# Patient Record
Sex: FEMALE | Race: OTHER | NOT HISPANIC OR LATINO | URBAN - METROPOLITAN AREA
[De-identification: names, ages, dates, MRNs, and addresses within clinical notes are randomized per-mention and may not be internally consistent; named-entity substitution may affect disease eponyms.]

---

## 2023-07-02 ENCOUNTER — EMERGENCY (EMERGENCY)
Facility: HOSPITAL | Age: 28
LOS: 1 days | Discharge: HOME | End: 2023-07-02
Attending: EMERGENCY MEDICINE | Admitting: EMERGENCY MEDICINE
Payer: COMMERCIAL

## 2023-07-02 VITALS
DIASTOLIC BLOOD PRESSURE: 76 MMHG | SYSTOLIC BLOOD PRESSURE: 112 MMHG | TEMPERATURE: 97 F | OXYGEN SATURATION: 100 % | HEART RATE: 70 BPM | RESPIRATION RATE: 16 BRPM

## 2023-07-02 VITALS
WEIGHT: 187.39 LBS | HEIGHT: 64 IN | RESPIRATION RATE: 17 BRPM | DIASTOLIC BLOOD PRESSURE: 76 MMHG | SYSTOLIC BLOOD PRESSURE: 107 MMHG | OXYGEN SATURATION: 95 % | TEMPERATURE: 98 F | HEART RATE: 84 BPM

## 2023-07-02 DIAGNOSIS — N76.89 OTHER SPECIFIED INFLAMMATION OF VAGINA AND VULVA: ICD-10-CM

## 2023-07-02 DIAGNOSIS — N76.4 ABSCESS OF VULVA: ICD-10-CM

## 2023-07-02 DIAGNOSIS — Z88.1 ALLERGY STATUS TO OTHER ANTIBIOTIC AGENTS STATUS: ICD-10-CM

## 2023-07-02 PROCEDURE — 99284 EMERGENCY DEPT VISIT MOD MDM: CPT

## 2023-07-02 PROCEDURE — 56405 I&D VULVA/PERINEAL ABSCESS: CPT

## 2023-07-02 PROCEDURE — 99284 EMERGENCY DEPT VISIT MOD MDM: CPT | Mod: 25

## 2023-07-02 RX ORDER — ACETAMINOPHEN 500 MG
650 TABLET ORAL ONCE
Refills: 0 | Status: COMPLETED | OUTPATIENT
Start: 2023-07-02 | End: 2023-07-02

## 2023-07-02 RX ORDER — LIDOCAINE HCL 20 MG/ML
10 VIAL (ML) INJECTION ONCE
Refills: 0 | Status: DISCONTINUED | OUTPATIENT
Start: 2023-07-02 | End: 2023-07-06

## 2023-07-02 RX ORDER — LIDOCAINE HYDROCHLORIDE AND EPINEPHRINE 10; 10 MG/ML; UG/ML
10 INJECTION, SOLUTION INFILTRATION; PERINEURAL ONCE
Refills: 0 | Status: DISCONTINUED | OUTPATIENT
Start: 2023-07-02 | End: 2023-07-06

## 2023-07-02 RX ORDER — IBUPROFEN 200 MG
600 TABLET ORAL ONCE
Refills: 0 | Status: COMPLETED | OUTPATIENT
Start: 2023-07-02 | End: 2023-07-02

## 2023-07-02 RX ADMIN — Medication 650 MILLIGRAM(S): at 17:54

## 2023-07-02 RX ADMIN — Medication 600 MILLIGRAM(S): at 17:54

## 2023-07-02 NOTE — ED PROVIDER NOTE - OBJECTIVE STATEMENT
28 yo w recurrent cysts with R labial swelling worsening today and drainage. no f/c, vaginal bleeding, taking ibuprofen yesterday.

## 2023-07-02 NOTE — ED ADULT TRIAGE NOTE - CHIEF COMPLAINT QUOTE
Pt c/o R labial cyst x 3 days, seen at  yesterday and prescribed abx. Pt reports history of cysts, "I never had to have one drained before, but this one isn't going away". Denies fevers, drainage. PMH asthma.

## 2023-07-02 NOTE — ED PROVIDER NOTE - CARE PROVIDER_API CALL
Asia Fernandez  Obstetrics and Gynecology  [address]  [place] [address]  Phone: [phone]  Fax: [phone]  Follow Up Time:

## 2023-07-02 NOTE — ED PROVIDER NOTE - CLINICAL SUMMARY MEDICAL DECISION MAKING FREE TEXT BOX
28 yo w recurrent cysts with R labial swelling worsening today and drainage. no f/c, vaginal bleeding, taking ibuprofen yesterday, likely abscess s/p I&D by gyn, to [facility] as outpt.

## 2023-07-02 NOTE — ED ADULT NURSE NOTE - NSFALLUNIVINTERV_ED_ALL_ED
Bed/Stretcher in lowest position, wheels locked, appropriate side rails in place/Call bell, personal items and telephone in reach/Instruct patient to call for assistance before getting out of bed/chair/stretcher/Non-slip footwear applied when patient is off stretcher/[place] to call system/Physically safe environment - no spills, clutter or unnecessary equipment/Purposeful proactive rounding/Room/bathroom lighting operational, light cord in reach

## 2023-07-02 NOTE — ED PROVIDER NOTE - PHYSICAL EXAMINATION
CONSTITUTIONAL: Awake, alert and in no apparent distress.  HEENT: Head is atraumatic. Eyes clear bilaterally, normal EOMI. Airway patent.  : R labial swelling w purulent drainage  NEUROLOGICAL: Alert, no focal deficits, no motor or sensory deficits.  SKIN: Skin normal color for race, warm, dry and intact. No evidence of rash.  PSYCHIATRIC: Normal mood and affect. no apparent risk to self or others.

## 2023-07-02 NOTE — CONSULT NOTE ADULT - ASSESSMENT
*note incomplete pending exam* Pt is a 28 yo P0 presenting to ED for vulvar abscess  - Clinically and hemodynamically stable  - Patient consented for incision and drainage of abscess. Procedure performed with expression of minimal pus.   - Patient should continue her full course of bactrim as prescribed by urgent care  - Plan to follow up at Dr. Asia Fernandez's office on [address] to ensure abscess is healing and for routine GYN care    Seen and discussed with attending Dr. Fernandez.    PGY2 Pt is a 26 yo P0 presenting to ED for vulvar abscess  - Clinically and hemodynamically stable  - Patient consented for incision and drainage of abscess. Procedure performed with expression of minimal pus. Pus not cultured, as patient is already pending culture from Urgent Care and this is first episode requiring drainage. Plan to culture in office if repeat I&D is required.   - Patient should continue her full course of bactrim as prescribed by urgent care  - Plan to follow up at Dr. Asia Fernandez's office on [address] to ensure abscess is healing and for routine GYN care    Seen and discussed with attending Dr. Fernandez.    PGY2 Pt is a 28 yo P0 presenting to ED for vulvar abscess  - Clinically and hemodynamically stable  - Patient consented for incision and drainage of abscess. Procedure performed with expression of minimal pus. Pus not cultured, as patient is already pending culture from Urgent Care and this is first episode requiring drainage. Plan to culture in office if repeat I&D is required.   - Patient should continue her full course of bactrim as prescribed by urgent care  - Plan to follow up at Dr. Asia Fernandez's office on [address] in 2 wks to ensure abscess is healing and for routine GYN care    Seen and discussed with attending Dr. Fernandez.    PGY2

## 2023-07-02 NOTE — ED ADULT NURSE NOTE - NSFALLASSESSNEED_ED_ALL_ED
Findings discussed with patient in detail. Pt will closely monitor symptoms for any change and understands the importance of prompt outpatient follow up and will return if worse.    no

## 2023-07-02 NOTE — CONSULT NOTE ADULT - SUBJECTIVE AND OBJECTIVE BOX
Pt is a 26 yo P0 presenting to ED for vulvar abscess. She states she has a history of Bartholin's cysts and 3 days ago she noticed that same area becoming more tender and enlarging. She had fluconazole at home and took two doses of that stating she had used it in the past for this problem. She had a bartholin's cysts one year ago that resolved with fluconazole. Currently she experiences 7-8/10 pain with movement or touching of the area, but no pain at rest. She took motrin yesterday with no relief. She states it is draining blood pus. She went to urgent care one day ago where she received bactrim which she has been taking. States that she may have shaved in that area a few days ago but does not remember exactly. Pt denies fever, chills, chest pain, SOB, abdominal pain, nausea, vomiting, vaginal bleeding.       OB/GYN Hx: P0. LMP 1.5-2 months ago. Irregular periods 2/2 PCOS diagnosed with US in [place]. No using contraception. Last sexually active 3 months ago with men only.   Last PAP smear:     PMHx: PCOS, asthma  SHx: denies  Meds: denies  Allergies: azithromycin - diarrhea    Social hx:     PHYSICAL EXAM:   Vital Signs Last 24 Hrs  T(C): 36.3 (02 Jul 2023 19:44), Max: 36.9 (02 Jul 2023 16:54)  T(F): 97.4 (02 Jul 2023 19:44), Max: 98.4 (02 Jul 2023 16:54)  HR: 74 (02 Jul 2023 19:44) (74 - 84)  BP: 106/71 (02 Jul 2023 19:44) (106/71 - 107/76)  BP(mean): --  RR: 18 (02 Jul 2023 19:44) (17 - 18)  SpO2: 98% (02 Jul 2023 19:44) (95% - 98%)    Parameters below as of 02 Jul 2023 19:44  Patient On (Oxygen Delivery Method): room air        **************************  Constitutional: Alert & Oriented x3, No acute distress  Respiratory: no increased WOB  Gastrointestinal: soft, non tender, no rebound or guarding   Pelvic exam: enlarged cystic structure on right labia majora, draining yellow pus   Extremities: no calf tenderness or swelling    LABS:                  RADIOLOGY & ADDITIONAL STUDIES: Pt is a 28 yo P0 presenting to ED for vulvar abscess. She states she has a history of Bartholin's cysts and 3 days ago she noticed that same area becoming more tender and enlarging. She had fluconazole at home and took two doses of that stating she had used it in the past for this problem. She had a bartholin's cysts one year ago that resolved with fluconazole. Currently she experiences 7-8/10 pain with movement or touching of the area, but no pain at rest. She took motrin yesterday with no relief. She states it is draining blood pus. She went to urgent care one day ago where she received bactrim which she has been taking. States that she may have shaved in that area a few days ago but does not remember exactly. Pt denies fever, chills, chest pain, SOB, abdominal pain, nausea, vomiting, vaginal bleeding.       OB/GYN Hx: P0. LMP 1.5-2 months ago. Irregular periods 2/2 PCOS diagnosed with US in [place]. Not using contraception. Last sexually active 3 months ago with men only.   Last PAP smear: never performed    PMHx: PCOS, asthma  SHx: denies  Meds: denies  Allergies: azithromycin - diarrhea      PHYSICAL EXAM:   Vital Signs Last 24 Hrs  T(C): 36.3 (02 Jul 2023 19:44), Max: 36.9 (02 Jul 2023 16:54)  T(F): 97.4 (02 Jul 2023 19:44), Max: 98.4 (02 Jul 2023 16:54)  HR: 74 (02 Jul 2023 19:44) (74 - 84)  BP: 106/71 (02 Jul 2023 19:44) (106/71 - 107/76)  BP(mean): --  RR: 18 (02 Jul 2023 19:44) (17 - 18)  SpO2: 98% (02 Jul 2023 19:44) (95% - 98%)    Parameters below as of 02 Jul 2023 19:44  Patient On (Oxygen Delivery Method): room air        **************************  Constitutional: Alert & Oriented x3, No acute distress  Respiratory: no increased WOB  Gastrointestinal: soft, non tender, no rebound or guarding   Pelvic exam: enlarged 3cm  cystic structure on right labia majora, draining yellow pus, not extending into vagina   Extremities: no calf tenderness or swelling   Pt is a 28 yo P0 presenting to ED for vulvar pain. She states she has a history of Bartholin's cysts and 3 days ago she noticed that same area becoming more tender and enlarging. She had fluconazole at home and took two doses of that, stating she had used it in the past for this problem. Currently she experiences 7-8/10 pain with movement or touching of the area, but no pain at rest. She took motrin yesterday with no relief. She states it is draining bloody pus, but she had not noticed this until arriving in the ED. She went to urgent care one day ago where she received bactrim which she has been taking. They also cultured the abscess at that time. States that she may have shaved in that area a few days ago but does not remember exactly. Pt denies fever, chills, chest pain, SOB, abdominal pain, nausea, vomiting, vaginal bleeding. She was last sexually active 3 months ago. She had a bartholin's cysts one year ago that resolved with fluconazole. It did not require drainage and patient states it was not as bad as this occurrence. Last year was the only time she remembers having had an issue, but has noticed a bump in that area prior to then. She does not have a GYN here and recently moved from [place] in the past few months.       OB/GYN Hx: P0. LMP 1.5-2 months ago. Irregular periods 2/2 PCOS diagnosed with US in [place]. Not using contraception. Last sexually active 3 months ago with men only.   Last PAP smear: never performed    PMHx: PCOS, asthma  SHx: denies  Meds: denies  Allergies: azithromycin - diarrhea      PHYSICAL EXAM:   Vital Signs Last 24 Hrs  T(C): 36.3 (02 Jul 2023 19:44), Max: 36.9 (02 Jul 2023 16:54)  T(F): 97.4 (02 Jul 2023 19:44), Max: 98.4 (02 Jul 2023 16:54)  HR: 74 (02 Jul 2023 19:44) (74 - 84)  BP: 106/71 (02 Jul 2023 19:44) (106/71 - 107/76)  BP(mean): --  RR: 18 (02 Jul 2023 19:44) (17 - 18)  SpO2: 98% (02 Jul 2023 19:44) (95% - 98%)    Parameters below as of 02 Jul 2023 19:44  Patient On (Oxygen Delivery Method): room air        **************************  Constitutional: Alert & Oriented x3, No acute distress  Respiratory: no increased WOB  Gastrointestinal: soft, non tender, no rebound or guarding   Pelvic exam: enlarged 3cm cystic structure on right labia majora, non-erythematous, draining yellow pus, not extending into vagina on digital exam. Left labia majora, clitoral lala/clitoris, b/l labia minora normal appearing. No vaginal discharge noted.   Extremities: no calf tenderness or swelling      Procedure I&D of Vulvar Abscess:  - Patient consented for incision and drainage - risks/benefits/alternatives explained to patient. Patient asked all questions and showed understanding.   - Area prepped with alcohol and then lidocaine gel applied. Lidocaine 1% injected to numb area  - Manual expression performed with drainage of minimal yellow pus  - 11 blade scalpel used to create 2mm incision at 7 o'clock position in middle of R labia majora, follow by manual expression of minimal yellow pus  - Digital vaginal exam performed and abscess found not to extend into vagina  - Abscess no longer feeling fluctuant. Scar tissue palpated. Minimal bleeding resolving with pressure. Patient tolerated procedure well.     Pt is a 26 yo P0 presenting to ED for vulvar pain. She states she has a history of Bartholin's cysts and 3 days ago she noticed that same area becoming more tender and enlarging. She had fluconazole at home and took two doses of that, stating she had used it in the past for this problem. Currently she experiences 7-8/10 pain with movement or touching of the area, but no pain at rest. She took motrin yesterday with no relief. She states it is draining bloody pus, but she had not noticed this until arriving in the ED. She went to urgent care one day ago where she received bactrim which she has been taking. They also cultured the abscess at that time. States that she may have shaved in that area a few days ago but does not remember exactly. Pt denies fever, chills, chest pain, SOB, abdominal pain, nausea, vomiting, vaginal bleeding. No burning, itching, vaginal discharge or lesions. She was last sexually active 3 months ago. She had a bartholin's cysts one year ago that resolved with fluconazole. It did not require drainage and patient states it was not as bad as this occurrence. Last year was the only time she remembers having had an issue, but has noticed a bump in that area prior to then. She does not have a GYN here and recently moved from [place] in the past few months.       OB/GYN Hx: P0. LMP 1.5-2 months ago. Irregular periods 2/2 PCOS diagnosed with US in [place]. Not using contraception. Last sexually active 3 months ago with men only.   Last PAP smear: never performed    PMHx: PCOS, asthma  SHx: denies  Meds: denies  Allergies: azithromycin - diarrhea      PHYSICAL EXAM:   Vital Signs Last 24 Hrs  T(C): 36.3 (02 Jul 2023 19:44), Max: 36.9 (02 Jul 2023 16:54)  T(F): 97.4 (02 Jul 2023 19:44), Max: 98.4 (02 Jul 2023 16:54)  HR: 74 (02 Jul 2023 19:44) (74 - 84)  BP: 106/71 (02 Jul 2023 19:44) (106/71 - 107/76)  BP(mean): --  RR: 18 (02 Jul 2023 19:44) (17 - 18)  SpO2: 98% (02 Jul 2023 19:44) (95% - 98%)    Parameters below as of 02 Jul 2023 19:44  Patient On (Oxygen Delivery Method): room air        **************************  Constitutional: Alert & Oriented x3, No acute distress  Respiratory: no increased WOB  Gastrointestinal: soft, non tender, no rebound or guarding   Pelvic exam: enlarged 3cm cystic structure on right labia majora, non-erythematous, draining yellow pus, not extending into vagina on digital exam. Left labia majora, clitoral lala/clitoris, b/l labia minora normal appearing. No vaginal discharge noted.   Extremities: no calf tenderness or swelling      Procedure I&D of Vulvar Abscess:  - Patient consented for incision and drainage - risks/benefits/alternatives explained to patient. Patient asked all questions and showed understanding.   - Area prepped with alcohol and then lidocaine gel applied. Lidocaine 1% injected to numb area  - Manual expression performed with drainage of minimal yellow pus  - 11 blade scalpel used to create 2mm incision at 7 o'clock position in middle of R labia majora, follow by manual expression of minimal yellow pus  - Digital vaginal exam performed and abscess found not to extend into vagina  - Abscess no longer feeling fluctuant. Scar tissue palpated. Minimal bleeding resolving with pressure. Patient tolerated procedure well.     Pt is a 28 yo P0 presenting to ED for vulvar pain. She states she has a history of Bartholin's cysts and 3 days ago she noticed that same area becoming more tender and enlarging. She had fluconazole at home and took two doses of that, stating she had used it in the past for this problem. Currently she experiences 7-8/10 pain with movement or touching of the area, but no pain at rest. She took motrin yesterday with no relief. She states it is draining bloody pus, but she had not noticed this until arriving in the ED. She went to urgent care one day ago where she received bactrim which she has been taking. They also cultured the abscess at that time. States that she may have shaved in that area a few days ago but does not remember exactly. Pt denies fever, chills, chest pain, SOB, abdominal pain, nausea, vomiting, vaginal bleeding. No burning, itching, vaginal discharge or lesions. She was last sexually active 3 months ago. She had a bartholin's cysts one year ago that resolved with fluconazole. It did not require drainage and patient states it was not as bad as this occurrence. Last year was the only time she remembers having had an issue, but has noticed a bump in that area prior to then. She does not have a GYN here and recently moved from [place] in the past few months.       OB/GYN Hx: P0. LMP 1.5-2 months ago. Irregular periods 2/2 PCOS diagnosed with US in [place]. Not using contraception. Last sexually active 3 months ago with men only.   Last PAP smear: never performed    PMHx: PCOS, asthma  SHx: denies  Meds: denies  Allergies: azithromycin - diarrhea  SH: as above      PHYSICAL EXAM:   Vital Signs Last 24 Hrs  T(C): 36.3 (02 Jul 2023 19:44), Max: 36.9 (02 Jul 2023 16:54)  T(F): 97.4 (02 Jul 2023 19:44), Max: 98.4 (02 Jul 2023 16:54)  HR: 74 (02 Jul 2023 19:44) (74 - 84)  BP: 106/71 (02 Jul 2023 19:44) (106/71 - 107/76)  BP(mean): --  RR: 18 (02 Jul 2023 19:44) (17 - 18)  SpO2: 98% (02 Jul 2023 19:44) (95% - 98%)    Parameters below as of 02 Jul 2023 19:44  Patient On (Oxygen Delivery Method): room air        **************************  Constitutional: Alert & Oriented x3, No acute distress  Respiratory: no increased WOB  Gastrointestinal: soft, non tender, no rebound or guarding   Pelvic exam: enlarged 3cm cystic structure on right labia majora, non-erythematous, draining yellow pus, not extending into vagina on digital exam. Left labia majora, clitoral lala/clitoris, b/l labia minora normal appearing. No vaginal discharge noted.   Extremities: no calf tenderness or swelling      Procedure I&D of Vulvar Abscess:  - Patient consented for incision and drainage - risks/benefits/alternatives explained to patient. Patient asked all questions and showed understanding.   - Area prepped with alcohol and then lidocaine gel applied. Lidocaine 1% injected to numb area  - Manual expression performed with drainage of minimal yellow pus  - 11 blade scalpel used to create 2mm incision at 7 o'clock position in middle of R labia majora, follow by manual expression of minimal yellow pus  - Digital vaginal exam performed and abscess found not to extend into vagina  - Abscess no longer feeling fluctuant. Scar tissue palpated. Minimal bleeding resolving with pressure. Patient tolerated procedure well.

## 2023-07-02 NOTE — ED ADULT NURSE NOTE - OBJECTIVE STATEMENT
27y F PMHx PCOS c/o R labial cyst. Pt states she has hx bartholin gland cysts, noticed one several days ago. Attempted relief w warm compresses, seen yesterday at outside UC prescribed bactrim but states her pain is getting worse. Pt also taking fluconazole x3 days. Endorses severe pain, difficulty walking d/t pain. Denies fevers/chills, drainage, radiating pain, blood, urinary sx. Pt AAOx4, speaking in full and clear sentences, no acute distress at this moment.

## 2023-07-02 NOTE — ED PROVIDER NOTE - PATIENT PORTAL LINK FT
You can access the FollowMyHealth Patient Portal offered by [facility] by registering at the following website: [email or web address]. By joining [employer]’s FollowMyHealth portal, you will also be able to view your health information using other applications (apps) compatible with our system.

## 2023-12-07 ENCOUNTER — EMERGENCY (EMERGENCY)
Facility: HOSPITAL | Age: 28
LOS: 1 days | Discharge: HOME | End: 2023-12-07
Attending: EMERGENCY MEDICINE | Admitting: EMERGENCY MEDICINE
Payer: COMMERCIAL

## 2023-12-07 VITALS
SYSTOLIC BLOOD PRESSURE: 105 MMHG | HEART RATE: 85 BPM | RESPIRATION RATE: 18 BRPM | TEMPERATURE: 98 F | OXYGEN SATURATION: 98 % | DIASTOLIC BLOOD PRESSURE: 54 MMHG

## 2023-12-07 VITALS
OXYGEN SATURATION: 98 % | SYSTOLIC BLOOD PRESSURE: 122 MMHG | HEART RATE: 104 BPM | RESPIRATION RATE: 18 BRPM | TEMPERATURE: 98 F | DIASTOLIC BLOOD PRESSURE: 89 MMHG

## 2023-12-07 DIAGNOSIS — R10.2 PELVIC AND PERINEAL PAIN: ICD-10-CM

## 2023-12-07 DIAGNOSIS — Z87.2 PERSONAL HISTORY OF DISEASES OF THE SKIN AND SUBCUTANEOUS TISSUE: ICD-10-CM

## 2023-12-07 DIAGNOSIS — Z88.1 ALLERGY STATUS TO OTHER ANTIBIOTIC AGENTS STATUS: ICD-10-CM

## 2023-12-07 DIAGNOSIS — N76.4 ABSCESS OF VULVA: ICD-10-CM

## 2023-12-07 PROCEDURE — 99284 EMERGENCY DEPT VISIT MOD MDM: CPT

## 2023-12-07 PROCEDURE — 99284 EMERGENCY DEPT VISIT MOD MDM: CPT | Mod: 25

## 2023-12-07 PROCEDURE — 56405 I&D VULVA/PERINEAL ABSCESS: CPT

## 2023-12-07 RX ORDER — CEPHALEXIN 500 MG
1 CAPSULE ORAL
Qty: 28 | Refills: 0
Start: 2023-12-07 | End: 2023-12-13

## 2023-12-07 NOTE — ED PROVIDER NOTE - PATIENT PORTAL LINK FT
You can access the FollowMyHealth Patient Portal offered by [facility] by registering at the following website: [email or web address]. By joining [employer]’s FollowMyHealth portal, you will also be able to view your health information using other applications (apps) compatible with our system. You can access the FollowMyHealth Patient Portal offered by [facility] by registering at the following website: [email or web address]. By joining [email or web address]’s FollowMyHealth portal, you will also be able to view your health information using other applications (apps) compatible with our system.

## 2023-12-07 NOTE — ED ADULT NURSE NOTE - NSFALLUNIVINTERV_ED_ALL_ED
Bed/Stretcher in lowest position, wheels locked, appropriate side rails in place/Call bell, personal items and telephone in reach/Instruct patient to call for assistance before getting out of bed/chair/stretcher/Non-slip footwear applied when patient is off stretcher/[place] to call system/Physically safe environment - no spills, clutter or unnecessary equipment/Purposeful proactive rounding/Room/bathroom lighting operational, light cord in reach Bed/Stretcher in lowest position, wheels locked, appropriate side rails in place/Call bell, personal items and telephone in reach/Instruct patient to call for assistance before getting out of bed/chair/stretcher/Non-slip footwear applied when patient is off stretcher/[place] to call system/Physically safe environment - no spills, clutter or unnecessary equipment/Purposeful proactive rounding/Room/bathroom lighting operational, light cord in reach

## 2023-12-07 NOTE — ED PROVIDER NOTE - CLINICAL SUMMARY MEDICAL DECISION MAKING FREE TEXT BOX
28-year-old female with abscess/cyst right labia.  She has had this twice before in the same area.  GYN consulted and they were able to open somewhat and drained some but there is a lot of scar tissue and they recommended to the patient that she get this surgically excised.  Patient does have a GYN.  GYN recommended a course of Keflex and follow-up in 1 week

## 2023-12-07 NOTE — CONSULT NOTE ADULT - SUBJECTIVE AND OBJECTIVE BOX
28y G0 presenting with right labial abscess. Patient has a history of right labial abscesses twice prior, first in 2018 and most recently in July 2023, which was drained here at [facility]. She reports the symptoms of pain and swelling began a week ago and she waited until it was large enough for it to be drained. She reports a subjective fever at home yesterday. The cyst has not drained anything. Denies abnormal vaginal discharge or VB. Reports that the cyst completely resolved the prior times, with no remaining palpable tissue. She also reports she began undergoing laser hair removal treatments as she was told the hair could be contributing to the recurrence of the cyst.    OB H/x: G0  GYN H/x: PCOS. Right labial cysts as above. Denies hx of fibroids, abnormal pap smears, STIs, ovarian cysts    MED H/x: asthma  SURG H/x: none  Medications: albuterol PRN  Allergies: NKDA; sensitivity to azithromycin - diarrhea    Vital Signs Last 24 Hrs  T(C): 36.8 (07 Dec 2023 15:22), Max: 36.8 (07 Dec 2023 14:53)  T(F): 98.2 (07 Dec 2023 15:22), Max: 98.2 (07 Dec 2023 14:53)  HR: 104 (07 Dec 2023 15:22) (104 - 104)  BP: 122/89 (07 Dec 2023 15:22) (122/89 - 122/89)  BP(mean): --  RR: 18 (07 Dec 2023 15:22) (18 - 18)  SpO2: 98% (07 Dec 2023 15:22) (98% - 98%)    Parameters below as of 07 Dec 2023 15:22  Patient On (Oxygen Delivery Method): room air        Physical Exam:  Gen: NAD, comfortable  GI: soft, nontender  GYN: 2 x2 cm right labial cyst at the midpoint of the right labia majora without surrounding edema or erythema; otherwise normal vulva and vaginal introitus  Ext: no edema, erythema, tenderness     PROCEDURE NOTE:  -the area was cleaned with chlorhexidine prep and 5 cc lidocaine 1% was injected to the area  -the cyst began draining spontaneously and purulent fluid was expressed  -a 5 mm incision was made at the site of drainage and minimal drainage was expressed; further drainage was attempted using a hemostat to attempt to break up tissue without success  -hemostasis achieved with pressure to the area   28y G0 presenting with right labial abscess. Patient has a history of right labial abscesses twice prior, first in 2018 and most recently in July 2023, which was drained here at [facility]. She reports the symptoms of pain and swelling began a week ago and she waited until it was large enough for it to be drained. She reports a subjective fever at home yesterday. The cyst has not drained anything. Denies abnormal vaginal discharge or VB. Reports that the cyst completely resolved the prior times, with no remaining palpable tissue. She also reports she began undergoing laser hair removal treatments as she was told the hair could be contributing to the recurrence of the cyst.    OB H/x: G0  GYN H/x: PCOS. Right labial cysts as above. Denies hx of fibroids, abnormal pap smears, STIs, ovarian cysts    MED H/x: asthma  SURG H/x: none  Medications: albuterol PRN  Allergies: NKDA; sensitivity to azithromycin - diarrhea    Vital Signs Last 24 Hrs  T(C): 36.8 (07 Dec 2023 15:22), Max: 36.8 (07 Dec 2023 14:53)  T(F): 98.2 (07 Dec 2023 15:22), Max: 98.2 (07 Dec 2023 14:53)  HR: 104 (07 Dec 2023 15:22) (104 - 104)  BP: 122/89 (07 Dec 2023 15:22) (122/89 - 122/89)  BP(mean): --  RR: 18 (07 Dec 2023 15:22) (18 - 18)  SpO2: 98% (07 Dec 2023 15:22) (98% - 98%)    Parameters below as of 07 Dec 2023 15:22  Patient On (Oxygen Delivery Method): room air        Physical Exam:  Gen: NAD, comfortable  GI: soft, nontender  GYN: 2 x2 cm right labial cyst at the midpoint of the right labia majora without surrounding edema or erythema; otherwise normal vulva and vaginal introitus  Ext: no edema, erythema, tenderness     PROCEDURE NOTE:  -the area was cleaned with chlorhexidine prep and 5 cc lidocaine 1% was injected to the area  -the cyst began draining spontaneously and purulent fluid was expressed  -a 5 mm incision was made at the site of drainage and minimal drainage was expressed; further drainage was attempted using a hemostat to attempt to break up tissue without success  -hemostasis achieved with pressure to the area

## 2023-12-07 NOTE — ED PROVIDER NOTE - NSFOLLOWUPINSTRUCTIONS_ED_ALL_ED_FT
follow-up with your GYN in 1 week.  Return for worsening symptoms    Bartholin's Cyst Incision and Drainage, Care After  After Bartholin's cyst incision and drainage, it is common to have light vaginal discharge. It may contain blood. It is also common to have:  Mild pain.  Discomfort.  Follow these instructions at home:  The instructions below may help you care for yourself at home. Your health care provider may give you more instructions. If you have questions, ask your health care provider.    Medicines    Take over-the-counter and prescription medicines only as told by your health care provider.  If you were prescribed antibiotics, take them as told by your health care provider. Do not stop taking them even if you start to feel better.  Bathing    A bathtub partially filled with water.  Take sitz baths as told by your health care provider. You may be told to start these 1–2 days after your procedure. Take them once or twice each day.  Incision care    Follow instructions from your health care provider about:  Signs of infection.  How to take care of your incision. Make sure you:  Wash your hands with soap and water for at least 20 seconds before and after you change your bandage. If you cannot use soap and water, use hand .  Change your bandage.  Leave stitches or skin glue in place for at least 2 weeks.  Leave tape strips alone unless you are told to take them off. You may trim the edges of the tape strips if they curl up.  General instructions    Return to your normal activities when your health care provider says that it is safe.  Do not have vaginal sex or insert anything in your vagina until your health care provider says it is safe.  Wear an absorbent pad if you have any discharge.  Keep all follow-up visits. This is important. If you have a catheter, return to your health care provider to have it removed.  Contact a health care provider if:  You have chills or a fever.  You have pain even after taking medicine.  Your incision has signs of infection.  Your catheter comes out.  Summary  After your procedure, it is common to have mild pain and light discharge.  Start taking sitz baths as told by your health care provider.  Do not have vaginal sex or insert anything in your vagina until your health care provider says it is safe.  Contact your health care provider if your incision has signs of infection.  If you have a catheter, return to your health care provider to have it removed.  This information is not intended to replace advice given to you by your health care provider. Make sure you discuss any questions you have with your health care provider.    Document Revised: 04/21/2023 Document Reviewed: 04/21/2023  [employer] Patient Education © 2023 [employer]  [employer] logo  Terms and Conditions  Privacy Policy  Editorial Policy  All content on this site: Copyright © 2023 Else[employer], its licensors, and contributors. All rights are reserved, including those for text and data mining, AI training, and similar technologies. For all open access content, the Creative Commons licensing terms apply.  Cookies are used by this site. To decline or learn more, visit our Cookies page. follow-up with your GYN in 1 week.  Return for worsening symptoms    Bartholin's Cyst Incision and Drainage, Care After  After Bartholin's cyst incision and drainage, it is common to have light vaginal discharge. It may contain blood. It is also common to have:  Mild pain.  Discomfort.  Follow these instructions at home:  The instructions below may help you care for yourself at home. Your health care provider may give you more instructions. If you have questions, ask your health care provider.    Medicines    Take over-the-counter and prescription medicines only as told by your health care provider.  If you were prescribed antibiotics, take them as told by your health care provider. Do not stop taking them even if you start to feel better.  Bathing    A bathtub partially filled with water.  Take sitz baths as told by your health care provider. You may be told to start these 1–2 days after your procedure. Take them once or twice each day.  Incision care    Follow instructions from your health care provider about:  Signs of infection.  How to take care of your incision. Make sure you:  Wash your hands with soap and water for at least 20 seconds before and after you change your bandage. If you cannot use soap and water, use hand .  Change your bandage.  Leave stitches or skin glue in place for at least 2 weeks.  Leave tape strips alone unless you are told to take them off. You may trim the edges of the tape strips if they curl up.  General instructions    Return to your normal activities when your health care provider says that it is safe.  Do not have vaginal sex or insert anything in your vagina until your health care provider says it is safe.  Wear an absorbent pad if you have any discharge.  Keep all follow-up visits. This is important. If you have a catheter, return to your health care provider to have it removed.  Contact a health care provider if:  You have chills or a fever.  You have pain even after taking medicine.  Your incision has signs of infection.  Your catheter comes out.  Summary  After your procedure, it is common to have mild pain and light discharge.  Start taking sitz baths as told by your health care provider.  Do not have vaginal sex or insert anything in your vagina until your health care provider says it is safe.  Contact your health care provider if your incision has signs of infection.  If you have a catheter, return to your health care provider to have it removed.  This information is not intended to replace advice given to you by your health care provider. Make sure you discuss any questions you have with your health care provider.    Document Revised: 04/21/2023 Document Reviewed: 04/21/2023  [employer] Patient Education © 2023 [employer]  [employer] logo  Terms and Conditions  Privacy Policy  Editorial Policy  All content on this site: Copyright © 2023 Elsevier, its licensors, and contributors. All rights are reserved, including those for text and data mining, AI training, and similar technologies. For all open access content, the Creative Commons licensing terms apply.  Cookies are used by this site. To decline or learn more, visit our Cookies page.

## 2023-12-07 NOTE — ED PROVIDER NOTE - OBJECTIVE STATEMENT
Painful area in the right labia for about a week.  She has previously had a cyst in that area that needed to be drained she thinks last time was a few years ago.  She does have a GYN Dr. Courtney Vazquez but she did not call her.  She had a fever to 101 last night but she says she also has a cold and thinks it was from that.  The cyst is not draining but is very painful.   says she had a very long menstrual period last month and was started on an estrogen medication by her GYN to stop the bleeding

## 2023-12-07 NOTE — ED ADULT NURSE NOTE - OBJECTIVE STATEMENT
27 y/o F c/o pain in right labia area for approx x1 week. Pt endorses having a cyst in the area previously. Pt endorsed fever yesterday (101F), denied chills. Pt denies chest pain, SOB, N/V/D, dysuria, recent UTI, pt endorses completion of abx last week. PT A&Ox4, respirations even and unlabored, skin color WDL warm and dry, pt is ambulatory with a steady gait. No acute distress observed.

## 2023-12-07 NOTE — CONSULT NOTE ADULT - ASSESSMENT
28y G0 presenting with right labial abscess.    -I&D of cyst performed with relief of pressure by patient  -recommend keflex 500 QID x7 days  -recommend patient follow up in 1-2 weeks with outpatient GYN Dr. Fuller; patient may also see OBGYN faculty at [address] by making an appointment at [phone]  -recommend patient cancel laser hair removal treatments until cleared by outpatient GYN  -d/w patient that most likely there is a tract or remaining scar tissue that is causing recurrent cysts in the area and most likely it will require surgical removal of the tract by her GYN  -recommend standard vulvar hygiene: avoid use of soaps, cleanse with water only, gently pat dry, use 100% cotton underwear  -recommend patient return to ED if the area becomes significantly more swollen or painful or she has systemic signs of infection such as fever, chills, nausea, and vomiting    D/W Dr. George Puente MD PGY3  Procedure performed with MD Ashanti PGY2   28y G0 presenting with right labial abscess.    -I&D of cyst performed with relief of pressure by patient  -recommend keflex 500 QID x7 days  -recommend patient follow up in 1-2 weeks with outpatient GYN Dr. Fuller; patient may also see OBGYN faculty at [address] by making an appointment at [phone]  -recommend patient cancel laser hair removal treatments until cleared by outpatient GYN  -d/w patient that most likely there is a tract or remaining scar tissue that is causing recurrent cysts in the area and most likely it will require surgical removal of the tract by her GYN  -recommend standard vulvar hygiene: avoid use of soaps, cleanse with water only, gently pat dry, use 100% cotton underwear  -recommend patient return to ED if the area becomes significantly more swollen or painful or she has systemic signs of infection such as fever, chills, nausea, and vomiting    D/W Dr. George Puente MD PGY3  Procedure performed with MD Ashanti PGY2
